# Patient Record
Sex: MALE | Race: WHITE | HISPANIC OR LATINO | URBAN - METROPOLITAN AREA
[De-identification: names, ages, dates, MRNs, and addresses within clinical notes are randomized per-mention and may not be internally consistent; named-entity substitution may affect disease eponyms.]

---

## 2022-01-22 ENCOUNTER — EMERGENCY (EMERGENCY)
Facility: HOSPITAL | Age: 62
LOS: 1 days | Discharge: HOME | End: 2022-01-22
Admitting: EMERGENCY MEDICINE
Payer: OTHER MISCELLANEOUS

## 2022-01-22 VITALS
HEART RATE: 86 BPM | RESPIRATION RATE: 17 BRPM | SYSTOLIC BLOOD PRESSURE: 171 MMHG | WEIGHT: 179.9 LBS | HEIGHT: 68.11 IN | DIASTOLIC BLOOD PRESSURE: 99 MMHG | TEMPERATURE: 98 F | OXYGEN SATURATION: 96 %

## 2022-01-22 DIAGNOSIS — Z23 ENCOUNTER FOR IMMUNIZATION: ICD-10-CM
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
## 2022-01-22 DIAGNOSIS — S61.213A LACERATION WITHOUT FOREIGN BODY OF LEFT MIDDLE FINGER WITHOUT DAMAGE TO NAIL, INITIAL ENCOUNTER: ICD-10-CM
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
## 2022-01-22 PROCEDURE — 99283 EMERGENCY DEPT VISIT LOW MDM: CPT | Mod: 25

## 2022-01-22 PROCEDURE — 12001 RPR S/N/AX/GEN/TRNK 2.5CM/<: CPT

## 2022-01-22 RX ORDER — TETANUS TOXOID, REDUCED DIPHTHERIA TOXOID AND ACELLULAR PERTUSSIS VACCINE, ADSORBED 5; 2.5; 8; 8; 2.5 [IU]/.5ML; [IU]/.5ML; UG/.5ML; UG/.5ML; UG/.5ML
0.5 SUSPENSION INTRAMUSCULAR ONCE
Refills: 0 | Status: COMPLETED | OUTPATIENT
Start: 2022-01-22 | End: 2022-01-22

## 2022-01-22 RX ORDER — IBUPROFEN 200 MG
600 TABLET ORAL ONCE
Refills: 0 | Status: COMPLETED | OUTPATIENT
Start: 2022-01-22 | End: 2022-01-22

## 2022-01-22 RX ADMIN — Medication 600 MILLIGRAM(S): at 16:37

## 2022-01-22 RX ADMIN — TETANUS TOXOID, REDUCED DIPHTHERIA TOXOID AND ACELLULAR PERTUSSIS VACCINE, ADSORBED 0.5 MILLILITER(S): 5; 2.5; 8; 8; 2.5 SUSPENSION INTRAMUSCULAR at 16:37

## 2022-01-22 NOTE — ED ADULT TRIAGE NOTE - CHIEF COMPLAINT QUOTE
left middle finger laceration while cutting fennel with knife at work pta. bleeding currently controlled unk tdap

## 2022-01-22 NOTE — ED ADULT NURSE NOTE - ALCOHOL PRE SCREEN (AUDIT - C)
Post-Care Instructions: I reviewed with the patient in detail post-care instructions. Patient is to wear sunprotection, and avoid picking at any of the treated lesions. Pt may apply Vaseline to crusted or scabbing areas. Duration Of Freeze Thaw-Cycle (Seconds): 2 Detail Level: Detailed Render Post-Care Instructions In Note?: no Number Of Freeze-Thaw Cycles: 2 freeze-thaw cycles Consent: The patient's consent was obtained including but not limited to risks of crusting, scabbing, blistering, scarring, darker or lighter pigmentary change, recurrence, incomplete removal and infection. Statement Selected

## 2022-01-22 NOTE — ED PROVIDER NOTE - CLINICAL SUMMARY MEDICAL DECISION MAKING FREE TEXT BOX
60 y/o M presents to ED with lacerations to distal 3rd finger.  Pt well appearing, VSS, NAD.  Lacerations repaired as documented.  Wound care advised.  Pt informed the sutured skin may not heal as it is mostly epidermal skin but it will act as a biologic dressing and protect the tissue underneath.  Pt aware the skin my scab and discolor.  Signs of infection discussed.

## 2022-01-22 NOTE — ED ADULT TRIAGE NOTE - WEIGHT IN KG
Rx has been filed on July 9, 2019 3:59 PM  By: Jonathan Vergara  Patient has been notified Rx ready for  as I left patient a message on his identified VM.      81.6

## 2022-01-22 NOTE — ED PROVIDER NOTE - NSFOLLOWUPINSTRUCTIONS_ED_ALL_ED_FT
Initial 24 hours:   -  Keep wound clean and dry for 24 hours.  -  Minimal bleeding may occur.  If so, apply constant pressure for 5 minutes.  If bleeding continues, return to the emergency department.     After initial 24 hours:  -  Remove dressing, cleanse with soap and water, apply antibiotic ointment and cover with a clean dressing.  This should be done daily  -  Maintain a clean and dry dressing.  If the dressing gets wet or soiled, replace the dressing as needed.  -  Return for signs of infection:  redness, swelling, colored drainage, increased pain, fever.  -  Return to the emergency department, go to an urgent care or schedule a follow up visit with your primary care provider for suture removal in __7___ days.

## 2022-01-22 NOTE — ED PROVIDER NOTE - OBJECTIVE STATEMENT
62 y/o M presents to ED with a laceration to his L 3rd finger from a knife while working.  He states the  applied a powder substance to the laceration to control the bleeding.  He is not actively bleeding.  Pt is R hand dominant.  He denies numbness/tingling.  Last tetanus unknown.

## 2022-01-22 NOTE — ED PROVIDER NOTE - SKIN TEMP
linear laceration to finger pad of 3rd digit and to the nail, distal sensation intact, brisk cap refill.  No active drainage or bleeding/warm/dry

## 2022-01-22 NOTE — ED ADULT NURSE NOTE - OBJECTIVE STATEMENT
pt a&ox3 here for L. middle finger laceration while cutting fennel at work PTA. bleeding currently controlled. denies PMH. unknown tdap. will continue to monitor.

## 2022-01-22 NOTE — ED PROVIDER NOTE - PATIENT PORTAL LINK FT
You can access the FollowMyHealth Patient Portal offered by [facility] by registering at the following website: [email or web address]. By joining [employer]’s FollowMyHealth portal, you will also be able to view your health information using other applications (apps) compatible with our system.

## 2022-01-22 NOTE — ED PROCEDURE NOTE - NS ED PROCEDURE ASSISTED BY
The procedure was performed independently
The procedure was performed independently
DISPLAY PLAN FREE TEXT

## 2022-03-18 NOTE — ED ADULT NURSE NOTE - PAIN RATING/NUMBER SCALE (0-10): ACTIVITY
Mucinex DM - twice daily     Augmentin twice daily for 10 days.  Take with food.      Flonase nasal spray for 1 month then as needed   3 Negative

## 2025-06-30 NOTE — ED PROCEDURE NOTE - EBL
Orthopedic Progress Note    S: Pain well controlled, no new complaints;  reports improved ROM, redness, swelling; Denies numbness, tingling, focal weakness, cp, sob, fever, chills    O: NAD, respirations unlabored; full arom R elbow, minimal pain through ranges; mild ttp overtop the olcranon bursa with some fluctuance. Small circular area of erythema, skin intact; NVI    Patient Vitals for the past 4 hrs:   Temp Pulse BP   06/30/25 1000 -- 75 --   06/30/25 0832 98.2 °F (36.8 °C) 66 135/76     Recent Labs     06/29/25  1112 06/30/25  0601   HGB 14.6 14.2   HCT 44.7 44.7    167   BUN 14  --    K 3.7  --      --        XR ELBOW RIGHT (MIN 3 VIEWS)  Narrative: EXAM: XR ELBOW RIGHT (MIN 3 VIEWS)  ACC#: [id number]     INDICATION: pain     COMPARISON: None.    TECHNIQUE: 3 view(s) of the right elbow.    FINDINGS:  No acute fractures, dislocations, or radiopaque foreign bodies.  Impression:  No acute bony abnormalities.    Electronically signed by TIM Pham       A/P:  Recommend discharge on 1 week of Keflex and follow up with Dr. Ewing on Friday for re-evaluation.   Can take tylenol for pain; compress and Ice prn pain and swelling; Limit activity, and avoid direct pressure.   Return for worsening symptoms.     TOM Britton  Orthopedic Trauma Service  [facility]  
minimal
minimal